# Patient Record
Sex: FEMALE
[De-identification: names, ages, dates, MRNs, and addresses within clinical notes are randomized per-mention and may not be internally consistent; named-entity substitution may affect disease eponyms.]

---

## 2018-06-12 ENCOUNTER — HOSPITAL ENCOUNTER (EMERGENCY)
Dept: HOSPITAL 92 - ERS | Age: 33
Discharge: LEFT BEFORE DISCHARGE | End: 2018-06-12
Payer: SELF-PAY

## 2018-06-12 DIAGNOSIS — B35.3: Primary | ICD-10-CM

## 2018-06-12 DIAGNOSIS — F17.210: ICD-10-CM

## 2018-06-12 DIAGNOSIS — J45.909: ICD-10-CM

## 2018-06-12 NOTE — RAD
RIGHT FOOT:

6/12/18

 

Three views.

 

HISTORY: 

Foot pain. 

 

Tarsals unremarkable. Metatarsals and phalanges unremarkable. MTP joints unremarkable.

 

IMPRESSION:  

Unremarkable right foot. 

 

POS: [facility]